# Patient Record
Sex: FEMALE | Race: BLACK OR AFRICAN AMERICAN
[De-identification: names, ages, dates, MRNs, and addresses within clinical notes are randomized per-mention and may not be internally consistent; named-entity substitution may affect disease eponyms.]

---

## 2018-08-14 ENCOUNTER — HOSPITAL ENCOUNTER (EMERGENCY)
Dept: HOSPITAL 72 - EMR | Age: 33
Discharge: HOME | End: 2018-08-14
Payer: MEDICAID

## 2018-08-14 VITALS — WEIGHT: 240 LBS | HEIGHT: 65 IN | BODY MASS INDEX: 39.99 KG/M2

## 2018-08-14 VITALS — DIASTOLIC BLOOD PRESSURE: 85 MMHG | SYSTOLIC BLOOD PRESSURE: 129 MMHG

## 2018-08-14 DIAGNOSIS — I10: ICD-10-CM

## 2018-08-14 DIAGNOSIS — Z88.2: ICD-10-CM

## 2018-08-14 DIAGNOSIS — J45.909: ICD-10-CM

## 2018-08-14 DIAGNOSIS — B37.9: ICD-10-CM

## 2018-08-14 DIAGNOSIS — Z88.1: ICD-10-CM

## 2018-08-14 DIAGNOSIS — Z88.0: ICD-10-CM

## 2018-08-14 DIAGNOSIS — L98.9: Primary | ICD-10-CM

## 2018-08-14 DIAGNOSIS — E11.8: ICD-10-CM

## 2018-08-14 DIAGNOSIS — Z91.040: ICD-10-CM

## 2018-08-14 DIAGNOSIS — Z91.018: ICD-10-CM

## 2018-08-14 DIAGNOSIS — N30.00: ICD-10-CM

## 2018-08-14 LAB
ADD MANUAL DIFF: NO
ALBUMIN SERPL-MCNC: 3.5 G/DL (ref 3.4–5)
ALBUMIN/GLOB SERPL: 0.7 {RATIO} (ref 1–2.7)
ALP SERPL-CCNC: 44 U/L (ref 46–116)
ALT SERPL-CCNC: 19 U/L (ref 12–78)
ANION GAP SERPL CALC-SCNC: 11 MMOL/L (ref 5–15)
APPEARANCE UR: (no result)
APTT PPP: YELLOW S
AST SERPL-CCNC: 26 U/L (ref 15–37)
BASOPHILS NFR BLD AUTO: 1.1 % (ref 0–2)
BILIRUB SERPL-MCNC: 0.3 MG/DL (ref 0.2–1)
BUN SERPL-MCNC: 7 MG/DL (ref 7–18)
CALCIUM SERPL-MCNC: 8.9 MG/DL (ref 8.5–10.1)
CHLORIDE SERPL-SCNC: 104 MMOL/L (ref 98–107)
CO2 SERPL-SCNC: 20 MMOL/L (ref 21–32)
CREAT SERPL-MCNC: 0.8 MG/DL (ref 0.55–1.3)
EOSINOPHIL NFR BLD AUTO: 0.7 % (ref 0–3)
ERYTHROCYTE [DISTWIDTH] IN BLOOD BY AUTOMATED COUNT: 12 % (ref 11.6–14.8)
GLOBULIN SER-MCNC: 4.9 G/DL
GLUCOSE UR STRIP-MCNC: (no result) MG/DL
HCT VFR BLD CALC: 39.5 % (ref 37–47)
HGB BLD-MCNC: 13.4 G/DL (ref 12–16)
KETONES UR QL STRIP: (no result)
LEUKOCYTE ESTERASE UR QL STRIP: (no result)
LYMPHOCYTES NFR BLD AUTO: 41.5 % (ref 20–45)
MCV RBC AUTO: 92 FL (ref 80–99)
MONOCYTES NFR BLD AUTO: 6.5 % (ref 1–10)
NEUTROPHILS NFR BLD AUTO: 50.2 % (ref 45–75)
NITRITE UR QL STRIP: NEGATIVE
PH UR STRIP: 6 [PH] (ref 4.5–8)
PLATELET # BLD: 279 K/UL (ref 150–450)
POTASSIUM SERPL-SCNC: 4 MMOL/L (ref 3.5–5.1)
PROT UR QL STRIP: (no result)
RBC # BLD AUTO: 4.27 M/UL (ref 4.2–5.4)
SODIUM SERPL-SCNC: 135 MMOL/L (ref 136–145)
SP GR UR STRIP: 1.01 (ref 1–1.03)
UROBILINOGEN UR-MCNC: NORMAL MG/DL (ref 0–1)
WBC # BLD AUTO: 10.5 K/UL (ref 4.8–10.8)

## 2018-08-14 PROCEDURE — 87590 N.GONORRHOEAE DNA DIR PROB: CPT

## 2018-08-14 PROCEDURE — 87086 URINE CULTURE/COLONY COUNT: CPT

## 2018-08-14 PROCEDURE — 81025 URINE PREGNANCY TEST: CPT

## 2018-08-14 PROCEDURE — 99283 EMERGENCY DEPT VISIT LOW MDM: CPT

## 2018-08-14 PROCEDURE — 36415 COLL VENOUS BLD VENIPUNCTURE: CPT

## 2018-08-14 PROCEDURE — 80053 COMPREHEN METABOLIC PANEL: CPT

## 2018-08-14 PROCEDURE — 81003 URINALYSIS AUTO W/O SCOPE: CPT

## 2018-08-14 PROCEDURE — 87491 CHLMYD TRACH DNA AMP PROBE: CPT

## 2018-08-14 PROCEDURE — 85025 COMPLETE CBC W/AUTO DIFF WBC: CPT

## 2018-08-14 NOTE — EMERGENCY ROOM REPORT
History of Present Illness


General


Chief Complaint:  Vaginal


Source:  Patient





Present Illness


HPI


Patient presents with perineal rash.  She states she's had it since she was 

discharged from the hospital.  She's discharged on July 3 for pyelonephritis 

and sepsis.  The patient is diabetic (she reports "borderline" but is on 2 meds)

.  She reports pain in genitals is 7/10, mostly constant but worsened with 

urination - with burning.  Not radiate.  Was taking diclofenac in the past for 

pain, but she states it does not help.





Patient was admitted in June for UTI and sepsis.  She also had acute kidney 

injury at that time (creat 1.7).  She states that the rash developed while she 

was in the hospital and has persisted since that time.  She states that she was 

taking the Keflex 4 times a day initially, but then the pharmacy told her not 

to take it one time a day because it was too strong for her because she 

developed a rash in her perineum.  (She had been discharged with normal renal 

function.)





The last time she had sex with her boyfriend was Father's Day.  She states that 

they use condoms.  She states they do no have any foreplay or oral sex.  She 

states that her boyfriend denies any sexually transmitted diseases.





In the past she's been treated with 4 antibiotic shots after being seen for 

similar symptoms.  She alleges she has never had a rash and pain like this in 

the past.  However, review of diagnoses reveals candida and multiple visits for 

vaginitis.  In June 2017 she had vaginal pain and "satellite lesions" felt to 

be candida and reported pain at that time.  





She states her Ob/Gyn refuses to see her for "follow up" exams from the 

hospital.





Someone stole her [employer] machine.  Not eat for 2 days because they ran out 

of food at home.  Has been drinking juices.





H/O asthma.  No wheezing or coughing.





HA in past.  Denies now.





No NVD.


Allergies:  


Coded Allergies:  


     PENICILLINS (Verified  Allergy, Severe, Anaphylaxis, 6/28/18)


     PINEAPPLE (Verified  Allergy, Severe, 6/28/18)


     SULFAMETHOXAZOLE (Verified  Allergy, Severe, Anaphylaxis, 6/28/18)


     TRIMETHOPRIM (Verified  Allergy, Severe, Anaphylaxis, 6/28/18)


     LATEX, NATURAL RUBBER (Verified  Allergy, Unknown, 6/28/18)


Uncoded Allergies:  


     MUSHROOMS (Allergy, Unknown, 6/28/18)





Patient History


Past Medical History:  see triage record, old chart reviewed


Social History:  Denies: smoking


Social History Narrative


lives with aunt


Last Menstrual Period:  unk


Reviewed Nursing Documentation:  PMH: Agreed; PSxH: Agreed





Nursing Documentation-PMH


Hx Cardiac Problems:  Yes


Hx Hypertension:  Yes


Hx Pacemaker:  No


Hx Asthma:  Yes


Hx COPD:  No


Hx Diabetes:  Yes - Borderline DM


Hx Cancer:  No


Hx Gastrointestinal Problems:  No


Hx Dialysis:  No


Hx Neurological Problems:  No


Hx Cerebrovascular Accident:  No


Hx Seizures:  No





Review of Systems


All Other Systems:  negative except mentioned in HPI





Physical Exam





Vital Signs








  Date Time  Temp Pulse Resp B/P (MAP) Pulse Ox O2 Delivery O2 Flow Rate FiO2


 


8/14/18 00:41 98.6 92 18 115/81 97 Room Air  





 98.6       








Sp02 EP Interpretation:  reviewed, normal


General Appearance:  no apparent distress, GCS 15, obese


Head:  normocephalic


Eyes:  bilateral eye normal inspection, bilateral eye PERRL, bilateral eye EOMI


ENT:  moist mucus membranes


Neck:  supple


Respiratory:  lungs clear, normal breath sounds


Cardiovascular #1:  regular rate, rhythm


Cardiovascular #2:  2+ radial (R)


Gastrointestinal:  normal inspection, normal bowel sounds, non tender, no mass, 

non-distended, overweight


Genitourinary:  other - multiple ulcerated lesions throughout genitals and 

perineum.  No significant labial swelling.  Lesions are painful.  No area of 

fluctuance.  Speculum exam deferred due to discomfort.


Musculoskeletal:  back normal, gait/station normal, normal range of motion


Neurologic:  alert, oriented x3, grossly normal


Psychiatric:  other - slightly slow in responses though appropriate


Skin:  normal inspection, warm/dry, other - see 





Medical Decision Making


Diagnostic Impression:  


 Primary Impression:  


 Lesion of female perineum


 Additional Impressions:  


 UTI (urinary tract infection)


 Qualified Codes:  N30.00 - Acute cystitis without hematuria


 Diabetes


 Qualified Codes:  E11.8 - Type 2 diabetes mellitus with unspecified 

complications


 Monilia infection


ER Course


Patient with painful vaginal lesions for 1 month after treatment for UTI/

sepsis.  Also was advised to change dosing of keflex to a probable ineffective 

dosing schedule.  Ddx: UTI, herpes, candida, diabetes poor control with 

diabetic lesions, abrasions amongst others.  Based on exam herpes high on the 

list.  She has had some recurrent lesions in the past which were felt to be 

yeast.  Her diabetes places her at higher risk for monilia.  Based on the 

recent serious infection, labs indicated.  Will treat pain.





Labs with elevated glucose, normal renal function.  Urinalysis shows yeast and 

pyuria.  There is no evidence of Trichomonas at this time.  





The patient will be treated for herpes, UTI and also yeast.  In the past she 

was treated with fluconazole but only one dose.  Topical creams will be used at 

this time.  Consideration for longer course of fluconazole if this it not 

effective.





Advised patient of need for follow with her physician - preferably Ob/Gyn.  To 

state that she has a problem needing evaluation, not "hospital follow up".





She was given a sandwich but refused it because it has some ice on it.





Patient improved with treatment and understands treatment plan.





Patient stable for outpatient observation and treatment.





Laboratory Tests








Test


  8/14/18


00:11 8/14/18


01:40


 


Urine Color Yellow   


 


Urine Appearance Cloudy   


 


Urine pH 6 (4.5-8.0)   


 


Urine Specific Gravity


  1.015


(1.005-1.035) 


 


 


Urine Protein


  2+ (NEGATIVE)


H 


 


 


Urine Glucose (UA)


  2+ (NEGATIVE)


H 


 


 


Urine Ketones


  2+ (NEGATIVE)


H 


 


 


Urine Occult Blood


  5+ (NEGATIVE)


H 


 


 


Urine Nitrite


  Negative


(NEGATIVE) 


 


 


Urine Bilirubin


  Negative


(NEGATIVE) 


 


 


Urine Urobilinogen


  Normal MG/DL


(0.0-1.0) 


 


 


Urine Leukocyte Esterase


  3+ (NEGATIVE)


H 


 


 


Urine RBC


  10-15 /HPF (0


- 2)  H 


 


 


Urine WBC


  Tntc /HPF (0 -


2)  H 


 


 


Urine Squamous Epithelial


Cells Moderate /LPF


(NONE/OCC)  H 


 


 


Urine Bacteria


  Moderate /HPF


(NONE)  H 


 


 


Urine Yeast


  Few /HPF


(NONE)  H 


 


 


Urine HCG, Qualitative


  Negative


(NEGATIVE) 


 


 


Chlamydia trachomatis RNA Pending   


 


Neisseria gonorrhoeae RNA Pending   


 


White Blood Count


  


  10.5 K/UL


(4.8-10.8)


 


Red Blood Count


  


  4.27 M/UL


(4.20-5.40)


 


Hemoglobin


  


  13.4 G/DL


(12.0-16.0)


 


Hematocrit


  


  39.5 %


(37.0-47.0)


 


Mean Corpuscular Volume  92 FL (80-99)  


 


Mean Corpuscular Hemoglobin


  


  31.4 PG


(27.0-31.0)  H


 


Mean Corpuscular Hemoglobin


Concent 


  34.0 G/DL


(32.0-36.0)


 


Red Cell Distribution Width


  


  12.0 %


(11.6-14.8)


 


Platelet Count


  


  279 K/UL


(150-450)


 


Mean Platelet Volume


  


  7.1 FL


(6.5-10.1)


 


Neutrophils (%) (Auto)


  


  50.2 %


(45.0-75.0)


 


Lymphocytes (%) (Auto)


  


  41.5 %


(20.0-45.0)


 


Monocytes (%) (Auto)


  


  6.5 %


(1.0-10.0)


 


Eosinophils (%) (Auto)


  


  0.7 %


(0.0-3.0)


 


Basophils (%) (Auto)


  


  1.1 %


(0.0-2.0)


 


Sodium Level


  


  135 MMOL/L


(136-145)  L


 


Potassium Level


  


  4.0 MMOL/L


(3.5-5.1)


 


Chloride Level


  


  104 MMOL/L


()


 


Carbon Dioxide Level


  


  20 MMOL/L


(21-32)  L


 


Anion Gap


  


  11 mmol/L


(5-15)


 


Blood Urea Nitrogen


  


  7 mg/dL (7-18)


 


 


Creatinine


  


  0.8 MG/DL


(0.55-1.30)


 


Estimate Glomerular


Filtration Rate 


  > 60 mL/min


(>60)


 


Glucose Level


  


  161 MG/DL


()  H


 


Calcium Level


  


  8.9 MG/DL


(8.5-10.1)


 


Total Bilirubin


  


  0.3 MG/DL


(0.2-1.0)


 


Aspartate Amino Transferase


(AST) 


  26 U/L (15-37)


 


 


Alanine Aminotransferase (ALT)


  


  19 U/L (12-78)


 


 


Alkaline Phosphatase


  


  44 U/L


()  L


 


Total Protein


  


  8.4 G/DL


(6.4-8.2)  H


 


Albumin


  


  3.5 G/DL


(3.4-5.0)


 


Globulin  4.9 g/dL  


 


Albumin/Globulin Ratio


  


  0.7 (1.0-2.7)


L











Last Vital Signs








  Date Time  Temp Pulse Resp B/P (MAP) Pulse Ox O2 Delivery O2 Flow Rate FiO2


 


8/14/18 05:14 98.2 101 18 129/85 97 Room Air  





 98.2       








Status:  improved


Disposition:  HOME, SELF-CARE


Condition:  Improved


Scripts


Lancets (ADVOCATE LANCET) 1 Each Each


EACH MC DAILY, #100


   Prov: Stefan Prieto M.D.         8/14/18 


Blood-Glucose Meter (BLOOD GLUCOSE METER) 1 Each Each


EACH MC DAILY, #1


   Prov: Stefan Prieto M.D.         8/14/18 


Nitrofurantoin Monohyd/M-Cryst* (MACROBID 100 MG*) 100 Mg Capsule


100 MG ORAL EVERY 12 HOURS, #14 CAP


   Prov: Stefan Prieto M.D.         8/14/18 


Hydrocodone Bit/Acetaminophen 5-325* (NORCO 5-325*) 1 Each Tablet


1 TAB ORAL Q6H PRN for For Pain, #10 TAB 0 Refills


   Prov: Stefan Prieto M.D.         8/14/18 


Acyclovir* (ACYCLOVIR*) 400 Mg Tablet


400 MG ORAL TID, #20 TAB


   Prov: Stefan Prieto M.D.         8/14/18 


Clotrimazole (GYNE-LOTRIMIN*) 45 Gm Cream.appl


1 APPLIC VG HS for 7 Days, #45 GM 0 Refills


   Prov: Stefan Prieto M.D.         8/14/18


Referrals:  


NON PHYSICIAN (PCP)











Stefan Prieto M.D. Aug 14, 2018 01:22

## 2018-09-08 ENCOUNTER — HOSPITAL ENCOUNTER (EMERGENCY)
Dept: HOSPITAL 72 - EMR | Age: 33
Discharge: HOME | End: 2018-09-08
Payer: MEDICAID

## 2018-09-08 VITALS — WEIGHT: 236 LBS | HEIGHT: 65 IN | BODY MASS INDEX: 39.32 KG/M2

## 2018-09-08 VITALS — SYSTOLIC BLOOD PRESSURE: 117 MMHG | DIASTOLIC BLOOD PRESSURE: 71 MMHG

## 2018-09-08 VITALS — SYSTOLIC BLOOD PRESSURE: 126 MMHG | DIASTOLIC BLOOD PRESSURE: 78 MMHG

## 2018-09-08 DIAGNOSIS — S93.602A: ICD-10-CM

## 2018-09-08 DIAGNOSIS — S93.402A: Primary | ICD-10-CM
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
## 2018-09-08 PROCEDURE — 99283 EMERGENCY DEPT VISIT LOW MDM: CPT

## 2018-09-08 NOTE — DIAGNOSTIC IMAGING REPORT
EXAM:

  XR Left Ankle Complete, 3 or More Views

 

CLINICAL HISTORY:

  PAIN

 

TECHNIQUE:

  Frontal, lateral and oblique views of the left ankle.

 

COMPARISON:

  No relevant prior studies available.

 

FINDINGS:

  Bones/joints: Posterior calcaneal spur.  No acute fracture.  No 

dislocation.

  Soft tissues:  Unremarkable.

 

IMPRESSION:     

Calcaneal spur.  Otherwise unremarkable.

## 2018-09-08 NOTE — EMERGENCY ROOM REPORT
History of Present Illness


General


Chief Complaint:  Pain


Source:  Patient, Medical Record





Present Illness


HPI


33-year-old female presents to the emergency department complaining of 6 out of 

10 in severity localized pain to the anterior portion of the right ankle 3 

days.  Patient reports that she was playfully kicking at a family member who 

blocked her kicked and caused acute pain in the anterior portion of the ankle.  

She denies trauma or fall otherwise.  Patient reports she is able to ambulate 

however there is some pain with ambulation and flexing the foot towards her.  

Denies previous injury to this extremity.  Patient denies swelling, bruising, 

erythema, increased temperature palpation.  Denies paresthesias or loss of 

gross motor movements.


Allergies:  


Coded Allergies:  


     PENICILLINS (Verified  Allergy, Severe, Anaphylaxis, 9/8/18)


     PINEAPPLE (Verified  Allergy, Severe, 9/8/18)


     SULFAMETHOXAZOLE (Verified  Allergy, Severe, Anaphylaxis, 9/8/18)


     TRIMETHOPRIM (Verified  Allergy, Severe, Anaphylaxis, 9/8/18)


     LATEX, NATURAL RUBBER (Verified  Allergy, Unknown, 9/8/18)


Uncoded Allergies:  


     MUSHROOMS (Allergy, Unknown, 6/28/18)





Patient History


Past Medical History:  see triage record


Past Surgical History:  none


Pertinent Family History:  none


Pregnant Now:  No


Immunizations:  UTD


Reviewed Nursing Documentation:  PMH: Agreed; PSxH: Agreed





Nursing Documentation-PMH


Past Medical History:  No History, Except For


Hx Cardiac Problems:  Yes


Hx Hypertension:  Yes


Hx Pacemaker:  No


Hx Asthma:  Yes


Hx COPD:  No


Hx Diabetes:  Yes - Borderline DM


Hx Cancer:  No


Hx Gastrointestinal Problems:  No


Hx Dialysis:  No


Hx Neurological Problems:  No


Hx Cerebrovascular Accident:  No


Hx Seizures:  No





Review of Systems


All Other Systems:  negative except mentioned in HPI





Physical Exam





Vital Signs








  Date Time  Temp Pulse Resp B/P (MAP) Pulse Ox O2 Delivery O2 Flow Rate FiO2


 


9/8/18 15:38 98.8 77 16 126/78 96 Room Air  





 98.8       








Sp02 EP Interpretation:  reviewed, normal


General Appearance:  no apparent distress, alert, GCS 15, non-toxic


Head:  normocephalic, atraumatic


ENT:  hearing grossly normal, normal voice


Neck:  full range of motion


Respiratory:  lungs clear, normal breath sounds, speaking full sentences


Cardiovascular #1:  regular rate, rhythm, normal capillary refill


Cardiovascular #2:  2+ dorsalis pedis (L)


Musculoskeletal:  back normal, gait/station normal, normal range of motion, 

tender - TTP to the anterior left ankle, FROM , no obvious swelling, no bruises

, no obvious deformities or increased laxity.


Neurologic:  alert, oriented x3, responsive, motor strength/tone normal, 

sensory intact, normal gait, speech normal, grossly normal


Psychiatric:  judgement/insight normal


Skin:  normal color, no rash, warm/dry, well hydrated





Medical Decision Making


PA Attestation


Dr. Johnson  is my supervising Physician whom patient management has been 

discussed with.


Diagnostic Impression:  


 Primary Impression:  


 Ankle sprain


 Qualified Codes:  S93.402A - Sprain of unspecified ligament of left ankle, 

initial encounter


 Additional Impression:  


 Foot sprain


 Qualified Codes:  S93.602A - Unspecified sprain of left foot, initial encounter


ER Course


33-year-old female presents to the emergency department complaining of 6 out of 

10 in severity localized pain to the anterior portion of the left  ankle 3 

days.  Patient reports that she was playfully kicking at a family member who 

blocked her kicked and caused acute pain in the anterior portion of the ankle.  

She denies trauma or fall otherwise.  Patient reports she is able to ambulate 

however there is some pain with ambulation and flexing the foot towards her.  

Denies previous injury to this extremity.  Patient denies swelling, bruising, 

erythema, increased temperature palpation.  Denies paresthesias or loss of 

gross motor movements.





Ddx considered but are not limited to Fracture, dislocation, contusion,  Sprain/

Strain/Spasm.





Vital signs: are WNL, pt. is afebrile


H&PE are most consistent with musculoskeletal injury will perform imaging to r/

o fractures/dislocations. 





ORDERS:





-  X-ray  eft   - negative for fx, Dislocation, or significant soft tissue 

injury, per preliminary read in ED, and signed by  CRAIG Lee, my 

supervising physician has reviewed, and agrees with my interpretation.





ED INTERVENTIONS:





-  Motrin PO


Ace wrap applied to the left ankle  by ED tech. Pt. remains neurovascularly 

intact.





pt declines crutches








DISCHARGE: At this time pt. is stable for d/c to home. Will provide printed 

patient care instructions, and any necessary prescriptions. Care plan and 

follow up instructions have been discussed with the patient prior to discharge.


Other X-Ray Diagnostic Results


Other X-Ray Diagnostic Results :  


   X-Ray ordered:  Left Ankle


   # of Views/Limited Vs Complete:  3 View


   Indication:  Pain


   EP Interpretation:  Yes


   PA Xray:  Interpretation reviewed, by supervising MD, and agrees with 

findings.


   Interpretation:  no dislocation, no soft tissue swelling, no fractures


   Impression:  No acute disease


   Electronically Signed by:  Evette Lee PA-C





Last Vital Signs








  Date Time  Temp Pulse Resp B/P (MAP) Pulse Ox O2 Delivery O2 Flow Rate FiO2


 


9/8/18 15:40 98.8 77 16 126/78 96 Room Air  





 98.8       








Disposition:  HOME, SELF-CARE


Condition:  Stable


Scripts


Ibuprofen* (MOTRIN*) 600 Mg Tablet


600 MG ORAL THREE TIMES A DAY, #30 TAB 0 Refills


   Prov: Evette Lee         9/8/18


Referrals:  


NON PHYSICIAN (PCP)


Patient Instructions:  Ankle Sprain, Easy-to-Read, Foot Sprain





Additional Instructions:  


Take medications as directed. 


 ** Follow up with a Primary Care Provider in 3-5 days, even if your symptoms 

have resolved. ** 


--Please review list of primary care clinics, if you do not already have a 

primary care provider





Return sooner to ED if new symptoms occur, or current symptoms become worse. 











- Please note that this Emergency Department Report was dictated using [employer] technology software, occasionally this can lead to 

erroneous entry secondary to interpretation by the dictation equipment.











Evette Lee Sep 8, 2018 17:11

## 2019-02-25 ENCOUNTER — HOSPITAL ENCOUNTER (EMERGENCY)
Dept: HOSPITAL 72 - EMR | Age: 34
Discharge: HOME | End: 2019-02-25
Payer: MEDICAID

## 2019-02-25 VITALS — BODY MASS INDEX: 39.99 KG/M2 | WEIGHT: 240 LBS | HEIGHT: 65 IN

## 2019-02-25 VITALS — SYSTOLIC BLOOD PRESSURE: 135 MMHG | DIASTOLIC BLOOD PRESSURE: 90 MMHG

## 2019-02-25 VITALS — SYSTOLIC BLOOD PRESSURE: 133 MMHG | DIASTOLIC BLOOD PRESSURE: 85 MMHG

## 2019-02-25 DIAGNOSIS — R73.03: ICD-10-CM

## 2019-02-25 DIAGNOSIS — Z91.018: ICD-10-CM

## 2019-02-25 DIAGNOSIS — Z91.040: ICD-10-CM

## 2019-02-25 DIAGNOSIS — N93.8: Primary | ICD-10-CM

## 2019-02-25 DIAGNOSIS — I10: ICD-10-CM

## 2019-02-25 DIAGNOSIS — Z88.0: ICD-10-CM

## 2019-02-25 DIAGNOSIS — L73.9: ICD-10-CM

## 2019-02-25 DIAGNOSIS — Z88.2: ICD-10-CM

## 2019-02-25 LAB
ADD MANUAL DIFF: NO
ANION GAP SERPL CALC-SCNC: 11 MMOL/L (ref 5–15)
BASOPHILS NFR BLD AUTO: 1.4 % (ref 0–2)
BUN SERPL-MCNC: 7 MG/DL (ref 7–18)
CALCIUM SERPL-MCNC: 9.3 MG/DL (ref 8.5–10.1)
CHLORIDE SERPL-SCNC: 100 MMOL/L (ref 98–107)
CO2 SERPL-SCNC: 22 MMOL/L (ref 21–32)
CREAT SERPL-MCNC: 1 MG/DL (ref 0.55–1.3)
EOSINOPHIL NFR BLD AUTO: 0.7 % (ref 0–3)
ERYTHROCYTE [DISTWIDTH] IN BLOOD BY AUTOMATED COUNT: 11.6 % (ref 11.6–14.8)
HCT VFR BLD CALC: 44.7 % (ref 37–47)
HGB BLD-MCNC: 14.7 G/DL (ref 12–16)
LYMPHOCYTES NFR BLD AUTO: 32.3 % (ref 20–45)
MCV RBC AUTO: 94 FL (ref 80–99)
MONOCYTES NFR BLD AUTO: 5.2 % (ref 1–10)
NEUTROPHILS NFR BLD AUTO: 60.4 % (ref 45–75)
PLATELET # BLD: 275 K/UL (ref 150–450)
POTASSIUM SERPL-SCNC: 5.2 MMOL/L (ref 3.5–5.1)
RBC # BLD AUTO: 4.73 M/UL (ref 4.2–5.4)
SODIUM SERPL-SCNC: 133 MMOL/L (ref 136–145)
WBC # BLD AUTO: 8.5 K/UL (ref 4.8–10.8)

## 2019-02-25 PROCEDURE — 36415 COLL VENOUS BLD VENIPUNCTURE: CPT

## 2019-02-25 PROCEDURE — 99283 EMERGENCY DEPT VISIT LOW MDM: CPT

## 2019-02-25 PROCEDURE — 80048 BASIC METABOLIC PNL TOTAL CA: CPT

## 2019-02-25 PROCEDURE — 81025 URINE PREGNANCY TEST: CPT

## 2019-02-25 PROCEDURE — 85025 COMPLETE CBC W/AUTO DIFF WBC: CPT

## 2019-02-25 NOTE — EMERGENCY ROOM REPORT
History of Present Illness


General


Chief Complaint:  Female Urogenital Problems


Source:  Medical Record





Present Illness


HPI


34 YO female presents to the ed c/o bleeding x 1 month. pt. with hx of uterine 

fibroids and irregular periods. pt. denies fevers or chills. denies pregnancy.  

Pt. also reports infected bug bite on the right side of her neck with tenderness

, erythema, swelling , and pustule. Pt. denies Dizziness, headache, syncope, 

palpitations or chest pain.  Patient states that at one point she was taking 

birth control to help with her symptoms however she states she is no longer 

utilizing medication as she did not see improvement in her symptoms.  Patient 

reports that she is in between insurances at this moment and cannot see her OB/

GYN that she was previously being evaluated. Reports intermittent 5/10 in 

severity cramping pains, denies pain at this time. denies vaginal d/c, swollen 

tender lymph nodes, hx of STI, suspicion for STI.


Allergies:  


Coded Allergies:  


     PENICILLINS (Verified  Allergy, Severe, Anaphylaxis, 9/8/18)


     PINEAPPLE (Verified  Allergy, Severe, 9/8/18)


     SULFAMETHOXAZOLE (Verified  Allergy, Severe, Anaphylaxis, 9/8/18)


     TRIMETHOPRIM (Verified  Allergy, Severe, Anaphylaxis, 9/8/18)


     LATEX, NATURAL RUBBER (Verified  Allergy, Unknown, 9/8/18)


Uncoded Allergies:  


     MUSHROOMS (Allergy, Unknown, 6/28/18)





Patient History


Past Medical History:  see triage record


Past Surgical History:  none


Pertinent Family History:  none


Last Menstrual Period:  01/25/19


Reviewed Nursing Documentation:  PMH: Agreed; PSxH: Agreed





Nursing Documentation-PMH


Past Medical History:  No History, Except For


Hx Cardiac Problems:  Yes


Hx Hypertension:  Yes


Hx Pacemaker:  No


Hx Asthma:  Yes


Hx COPD:  No


Hx Diabetes:  Yes - Borderline DM


Hx Cancer:  No


Hx Gastrointestinal Problems:  No


Hx Dialysis:  No


Hx Neurological Problems:  No


Hx Cerebrovascular Accident:  No


Hx Seizures:  No





Review of Systems


All Other Systems:  negative except mentioned in HPI





Physical Exam





Vital Signs








  Date Time  Temp Pulse Resp B/P (MAP) Pulse Ox O2 Delivery O2 Flow Rate FiO2


 


2/25/19 13:40 99.3 90 18 136/92 97 Room Air  








Sp02 EP Interpretation:  reviewed, normal


General Appearance:  no apparent distress, alert, GCS 15, non-toxic, obese


Head:  normocephalic, atraumatic


Eyes:  bilateral eye normal inspection, bilateral eye PERRL


ENT:  hearing grossly normal, normal voice, other - foliculitis on the right 

side of the neck/chin, pt. with significant facial hair suspect hirsutism.


Neck:  full range of motion


Respiratory:  chest non-tender, lungs clear, normal breath sounds, speaking 

full sentences


Cardiovascular #1:  regular rate, rhythm


Gastrointestinal:  normal bowel sounds, non tender, soft, non-distended, no 

guarding


Rectal:  deferred


Genitourinary:  normal inspection, no CVA tenderness


Musculoskeletal:  back normal, gait/station normal, normal range of motion, non-

tender


Neurologic:  alert, oriented x3, responsive, motor strength/tone normal, 

sensory intact, normal gait, speech normal, grossly normal


Psychiatric:  judgement/insight normal


Skin:  normal color, no rash, warm/dry, well hydrated





Medical Decision Making


PA Attestation


Dr. Hernandez is my supervising Physician whom patient management has been 

discussed with.


Diagnostic Impression:  


 Primary Impression:  


 DUB (dysfunctional uterine bleeding)


 Additional Impression:  


 Folliculitis


ER Course


34 YO female presents to the ed c/o bleeding x 1 month. pt. with hx of uterine 

fibroids and irregular periods. pt. denies fevers or chills. denies pregnancy. 


 


Ddx considered but are not limited to: Hormonal imbalance, pregnancy, ectopic 

pregnancy, DUB, Fibroid, ectopic pregnancy, anemia, hirsutism


Vital signs: are WNL, pt. is afebrile 





H&PE are most consistent with:  Irregular menses and folliculitis





ORDERS: 


-Urine hcg- Negative


-CBC: Unremarkable-- no evidence of significant blood loss. pt. asymptomatic


-BMP: mildly elevated potassium 








ED INTERVENTIONS: None at this time. 








-I do not identify an emergent condition at this time. With current presentation

,  pt. is stable for close outpatient follow up and conservative treatment.  D/

w pt. to return promptly to ED with worsening or new symptoms.- Pt. verbalizes' 

understanding and agreement with proposed treatment plan.proposed treatment 

plan.





DISCHARGE: At this time pt. is stable for d/c to home. Will provide printed 

patient care instructions, and any necessary prescriptions. Care plan and 

follow up instructions have been discussed with the patient prior to discharge.





Labs








Test


  2/25/19


13:56 2/25/19


14:10


 


White Blood Count


  8.5 K/UL


(4.8-10.8) 


 


 


Red Blood Count


  4.73 M/UL


(4.20-5.40) 


 


 


Hemoglobin


  14.7 G/DL


(12.0-16.0) 


 


 


Hematocrit


  44.7 %


(37.0-47.0) 


 


 


Mean Corpuscular Volume 94 FL (80-99)  


 


Mean Corpuscular Hemoglobin


  30.9 PG


(27.0-31.0) 


 


 


Mean Corpuscular Hemoglobin


Concent 32.8 G/DL


(32.0-36.0) 


 


 


Red Cell Distribution Width


  11.6 %


(11.6-14.8) 


 


 


Platelet Count


  275 K/UL


(150-450) 


 


 


Mean Platelet Volume


  7.2 FL


(6.5-10.1) 


 


 


Neutrophils (%) (Auto)


  60.4 %


(45.0-75.0) 


 


 


Lymphocytes (%) (Auto)


  32.3 %


(20.0-45.0) 


 


 


Monocytes (%) (Auto)


  5.2 %


(1.0-10.0) 


 


 


Eosinophils (%) (Auto)


  0.7 %


(0.0-3.0) 


 


 


Basophils (%) (Auto)


  1.4 %


(0.0-2.0) 


 


 


Sodium Level


  133 MMOL/L


(136-145) 


 


 


Potassium Level


  5.2 MMOL/L


(3.5-5.1) 


 


 


Chloride Level


  100 MMOL/L


() 


 


 


Carbon Dioxide Level


  22 MMOL/L


(21-32) 


 


 


Anion Gap


  11 mmol/L


(5-15) 


 


 


Blood Urea Nitrogen 7 mg/dL (7-18)  


 


Creatinine


  1.0 MG/DL


(0.55-1.30) 


 


 


Estimat Glomerular Filtration


Rate > 60 mL/min


(>60) 


 


 


Glucose Level


  364 MG/DL


() 


 


 


Calcium Level


  9.3 MG/DL


(8.5-10.1) 


 


 


Urine HCG, Qualitative


  


  Negative


(NEGATIVE)











Last Vital Signs








  Date Time  Temp Pulse Resp B/P (MAP) Pulse Ox O2 Delivery O2 Flow Rate FiO2


 


2/25/19 13:57 99.3 75 18 135/90 97 Room Air  








Disposition:  HOME, SELF-CARE


Condition:  Stable


Scripts


Mupirocin* (MUPIROCIN*) 22 Gm Oint...g.


1 APPLIC TOPIC THREE TIMES A DAY, #22 GM


   Prov: Evette Lee         2/25/19 


Doxycycline Hyclate* (VIBRAMYCIN*) 100 Mg Capsule


100 MG ORAL EVERY 12 HOURS for 7 Days, #14 CAP 0 Refills


   Prov: Evette Lee         2/25/19 


Iron,Carbonyl/Ascorbic Acid (IRON 100-VITAMIN C TABLET) 1 Each Tablet


1 EACH PO BID, #60 TAB


   Prov: Evette Lee         2/25/19 


Norethindrone-E.estradiol-Iron (Blisovi Fe 1.5-30 Tablet) 1 Each Tablet


1 EACH PO DAILY, #30 TAB


   Prov: Evette Lee         2/25/19


Referrals:  


NON PHYSICIAN (PCP)


Patient Instructions:  Folliculitis, Menorrhagia





Additional Instructions:  


Take medications as directed. 





 ** Follow up with a OBGYN  within 3-5 days, even if your symptoms have 

resolved. **   





Return sooner to ED if new symptoms occur, or current symptoms become worse. 











- Please note that this Emergency Department Report was dictated using [employer] technology software, occasionally this can lead to 

erroneous entry secondary to interpretation by the dictation equipment.











Evette Lee Feb 25, 2019 14:38

## 2019-02-25 NOTE — NUR
ED Nurse Note:



Pt came into the ER w/ complaints of vaginal pain x 2 months. Pt states that it 
has been bleeding intermittently. Pt is complaining of 7/10 pain in the groin 
area. A+ O x4. Ambulatory. Skin warm to touch.

## 2019-02-25 NOTE — NUR
ER DISCHARGE NOTE:

Patient is cleared to be discharged per ERMD, pt is aox4, on room air, with 
stable vital signs. pt was given dc and prescription instructions, pt was able 
to verbalize understanding, pt id band removed without complications. pt is 
able to ambulate with steady gait. pt took all belongings.

## 2020-02-02 ENCOUNTER — HOSPITAL ENCOUNTER (EMERGENCY)
Dept: HOSPITAL 72 - EMR | Age: 35
LOS: 1 days | Discharge: ANOTHER HOSPITAL | End: 2020-02-03
Payer: MEDICAID

## 2020-02-02 VITALS — BODY MASS INDEX: 41.65 KG/M2 | WEIGHT: 250 LBS | HEIGHT: 65 IN

## 2020-02-02 VITALS — SYSTOLIC BLOOD PRESSURE: 116 MMHG | DIASTOLIC BLOOD PRESSURE: 70 MMHG

## 2020-02-02 VITALS — DIASTOLIC BLOOD PRESSURE: 75 MMHG | SYSTOLIC BLOOD PRESSURE: 118 MMHG

## 2020-02-02 DIAGNOSIS — N39.0: ICD-10-CM

## 2020-02-02 DIAGNOSIS — I10: ICD-10-CM

## 2020-02-02 DIAGNOSIS — Z88.2: ICD-10-CM

## 2020-02-02 DIAGNOSIS — Z88.8: ICD-10-CM

## 2020-02-02 DIAGNOSIS — A41.9: Primary | ICD-10-CM

## 2020-02-02 DIAGNOSIS — Z91.040: ICD-10-CM

## 2020-02-02 DIAGNOSIS — Z91.018: ICD-10-CM

## 2020-02-02 DIAGNOSIS — R53.1: ICD-10-CM

## 2020-02-02 LAB
ADD MANUAL DIFF: NO
ALBUMIN SERPL-MCNC: 3.4 G/DL (ref 3.4–5)
ALBUMIN/GLOB SERPL: 0.7 {RATIO} (ref 1–2.7)
ALP SERPL-CCNC: 77 U/L (ref 46–116)
ALT SERPL-CCNC: 30 U/L (ref 12–78)
ANION GAP SERPL CALC-SCNC: 9 MMOL/L (ref 5–15)
APPEARANCE UR: (no result)
APTT BLD: 29 SEC (ref 23–33)
APTT PPP: (no result) S
AST SERPL-CCNC: 14 U/L (ref 15–37)
BASOPHILS NFR BLD AUTO: 0.6 % (ref 0–2)
BILIRUB SERPL-MCNC: 0.3 MG/DL (ref 0.2–1)
BUN SERPL-MCNC: 13 MG/DL (ref 7–18)
CALCIUM SERPL-MCNC: 9 MG/DL (ref 8.5–10.1)
CHLORIDE SERPL-SCNC: 97 MMOL/L (ref 98–107)
CK MB SERPL-MCNC: < 0.5 NG/ML (ref 0–3.6)
CK SERPL-CCNC: 80 U/L (ref 26–308)
CO2 SERPL-SCNC: 26 MMOL/L (ref 21–32)
CREAT SERPL-MCNC: 1.3 MG/DL (ref 0.55–1.3)
EOSINOPHIL NFR BLD AUTO: 0.3 % (ref 0–3)
ERYTHROCYTE [DISTWIDTH] IN BLOOD BY AUTOMATED COUNT: 11.4 % (ref 11.6–14.8)
GLOBULIN SER-MCNC: 4.8 G/DL
GLUCOSE UR STRIP-MCNC: (no result) MG/DL
HCT VFR BLD CALC: 38.7 % (ref 37–47)
HGB BLD-MCNC: 12.5 G/DL (ref 12–16)
INR PPP: 0.9 (ref 0.9–1.1)
KETONES UR QL STRIP: (no result)
LEUKOCYTE ESTERASE UR QL STRIP: (no result)
LYMPHOCYTES NFR BLD AUTO: 16.6 % (ref 20–45)
MCV RBC AUTO: 99 FL (ref 80–99)
MONOCYTES NFR BLD AUTO: 8.2 % (ref 1–10)
NEUTROPHILS NFR BLD AUTO: 74.4 % (ref 45–75)
NITRITE UR QL STRIP: NEGATIVE
PH UR STRIP: 6 [PH] (ref 4.5–8)
PHOSPHATE SERPL-MCNC: 1.9 MG/DL (ref 2.5–4.9)
PLATELET # BLD: 253 K/UL (ref 150–450)
POTASSIUM SERPL-SCNC: 3.6 MMOL/L (ref 3.5–5.1)
PROT UR QL STRIP: (no result)
RBC # BLD AUTO: 3.9 M/UL (ref 4.2–5.4)
SODIUM SERPL-SCNC: 132 MMOL/L (ref 136–145)
SP GR UR STRIP: 1.01 (ref 1–1.03)
UROBILINOGEN UR-MCNC: NORMAL MG/DL (ref 0–1)
WBC # BLD AUTO: 12.6 K/UL (ref 4.8–10.8)

## 2020-02-02 PROCEDURE — 85610 PROTHROMBIN TIME: CPT

## 2020-02-02 PROCEDURE — 83880 ASSAY OF NATRIURETIC PEPTIDE: CPT

## 2020-02-02 PROCEDURE — 96368 THER/DIAG CONCURRENT INF: CPT

## 2020-02-02 PROCEDURE — 82553 CREATINE MB FRACTION: CPT

## 2020-02-02 PROCEDURE — 84481 FREE ASSAY (FT-3): CPT

## 2020-02-02 PROCEDURE — 99291 CRITICAL CARE FIRST HOUR: CPT

## 2020-02-02 PROCEDURE — 85730 THROMBOPLASTIN TIME PARTIAL: CPT

## 2020-02-02 PROCEDURE — 83735 ASSAY OF MAGNESIUM: CPT

## 2020-02-02 PROCEDURE — 84100 ASSAY OF PHOSPHORUS: CPT

## 2020-02-02 PROCEDURE — 93005 ELECTROCARDIOGRAM TRACING: CPT

## 2020-02-02 PROCEDURE — 84443 ASSAY THYROID STIM HORMONE: CPT

## 2020-02-02 PROCEDURE — 80053 COMPREHEN METABOLIC PANEL: CPT

## 2020-02-02 PROCEDURE — 96375 TX/PRO/DX INJ NEW DRUG ADDON: CPT

## 2020-02-02 PROCEDURE — 81025 URINE PREGNANCY TEST: CPT

## 2020-02-02 PROCEDURE — 83605 ASSAY OF LACTIC ACID: CPT

## 2020-02-02 PROCEDURE — 87181 SC STD AGAR DILUTION PER AGT: CPT

## 2020-02-02 PROCEDURE — 84484 ASSAY OF TROPONIN QUANT: CPT

## 2020-02-02 PROCEDURE — 87040 BLOOD CULTURE FOR BACTERIA: CPT

## 2020-02-02 PROCEDURE — 83690 ASSAY OF LIPASE: CPT

## 2020-02-02 PROCEDURE — 86710 INFLUENZA VIRUS ANTIBODY: CPT

## 2020-02-02 PROCEDURE — 82962 GLUCOSE BLOOD TEST: CPT

## 2020-02-02 PROCEDURE — 81003 URINALYSIS AUTO W/O SCOPE: CPT

## 2020-02-02 PROCEDURE — 84439 ASSAY OF FREE THYROXINE: CPT

## 2020-02-02 PROCEDURE — 96365 THER/PROPH/DIAG IV INF INIT: CPT

## 2020-02-02 PROCEDURE — 87086 URINE CULTURE/COLONY COUNT: CPT

## 2020-02-02 PROCEDURE — 96366 THER/PROPH/DIAG IV INF ADDON: CPT

## 2020-02-02 PROCEDURE — 85025 COMPLETE CBC W/AUTO DIFF WBC: CPT

## 2020-02-02 PROCEDURE — 71045 X-RAY EXAM CHEST 1 VIEW: CPT

## 2020-02-02 PROCEDURE — 82550 ASSAY OF CK (CPK): CPT

## 2020-02-02 PROCEDURE — 36415 COLL VENOUS BLD VENIPUNCTURE: CPT

## 2020-02-02 NOTE — NUR
ED Nurse Note:



Medications administered, pt tolerated well. NO s/s of distress noted. vss, pt 
aao x 4, resting in bed comfortably

## 2020-02-02 NOTE — NUR
ED Nurse Note:



Pt resting in bed comfortably, aao x 4, no s/s of distress noted, vss. No 
adverse reaction to medications noted/reported.

## 2020-02-02 NOTE — NUR
ED Nurse Note:



Pt BIBA from home CO flu like symptom, n/v, fever, generalized weakness and 
pain 10/10, T 103. Pt reports feeling ill for last 2 days. BS in field was 362; 
BS in room 364 -- ERMD made aware. PT reports not taking diabetes medications 
for last 3 days and has not checked blood sugar because she has no more 
lancets. Pt aao x 4. Awaiting ERMD

## 2020-02-03 VITALS — DIASTOLIC BLOOD PRESSURE: 74 MMHG | SYSTOLIC BLOOD PRESSURE: 114 MMHG

## 2020-02-03 VITALS — SYSTOLIC BLOOD PRESSURE: 119 MMHG | DIASTOLIC BLOOD PRESSURE: 64 MMHG

## 2020-02-03 VITALS — SYSTOLIC BLOOD PRESSURE: 117 MMHG | DIASTOLIC BLOOD PRESSURE: 68 MMHG

## 2020-02-03 VITALS — SYSTOLIC BLOOD PRESSURE: 117 MMHG | DIASTOLIC BLOOD PRESSURE: 67 MMHG

## 2020-02-03 VITALS — SYSTOLIC BLOOD PRESSURE: 115 MMHG | DIASTOLIC BLOOD PRESSURE: 68 MMHG

## 2020-02-03 NOTE — NUR
ED Nurse Note:



Charge Nurse at [facility] unit was called; pending report handoff; 
will continue to reach for handoff.

## 2020-02-03 NOTE — NUR
ED Nurse Note:



[facility] -- RN Lanette given report; Report also given to [facility] for 
transfer. Pt aao x 4, no s/s of distress, VSS.

## 2020-02-03 NOTE — NUR
ED Nurse Note:



Attempt #3 to reach [facility] Charge Nurse; pending handoff report 
for transfer

## 2020-02-03 NOTE — DIAGNOSTIC IMAGING REPORT
Indication: Dyspnea

 

Comparison:  1/13/2014

 

A single view chest radiograph was obtained.

 

Findings:

 

No definite infiltrate or pulmonary vascular congestion identified.  The heart is

enlarged. Bones are unremarkable.

 

IMPRESSION:

 

Cardiomegaly

## 2020-02-03 NOTE — NUR
ED Nurse Note:



Pt resting in bed comfortably, no s/s of distress noted. VSS, aao x 4. PT 
sleeping.

## 2020-02-03 NOTE — EMERGENCY ROOM REPORT
History of Present Illness


General


Chief Complaint:  Generalized Weakness


Source:  Patient





Present Illness


HPI


34-year-old female history of hypertension, thyroid issues presents with fever/

chills x2 days, no known aggravating alleviating factors patient also reports 

generalized weakness, fatigue, no dysuria no nausea no vomiting, no abdominal 

pain, patient presents for evaluation


Allergies:  


Coded Allergies:  


     PENICILLINS (Verified  Allergy, Severe, Anaphylaxis, 9/8/18)


     PINEAPPLE (Verified  Allergy, Severe, 9/8/18)


     SULFAMETHOXAZOLE (Verified  Allergy, Severe, Anaphylaxis, 9/8/18)


     TRIMETHOPRIM (Verified  Allergy, Severe, Anaphylaxis, 9/8/18)


     LATEX, NATURAL RUBBER (Verified  Allergy, Unknown, 9/8/18)


Uncoded Allergies:  


     MUSHROOMS (Allergy, Unknown, 6/28/18)





Patient History


Past Medical History:  see triage record


Last Menstrual Period:  1/2020


Reviewed Nursing Documentation:  PMH: Agreed; PSxH: Agreed





Nursing Documentation-PMH


Hx Cardiac Problems:  Yes


Hx Hypertension:  Yes


Hx Pacemaker:  No


Hx Asthma:  Yes


Hx COPD:  No


Hx Diabetes:  Yes - Borderline DM


Hx Cancer:  No


Hx Gastrointestinal Problems:  No


Hx Dialysis:  No


Hx Neurological Problems:  No


Hx Cerebrovascular Accident:  No


Hx Seizures:  No





Review of Systems


All Other Systems:  negative except mentioned in HPI





Physical Exam





Vital Signs








  Date Time  Temp Pulse Resp B/P (MAP) Pulse Ox O2 Delivery O2 Flow Rate FiO2


 


2/2/20 21:49 103.1 140 20 140/100 (113) 96   


 


2/2/20 21:55      Room Air  








Sp02 EP Interpretation:  reviewed, normal


General Appearance:  well appearing, no apparent distress, alert, moderate 

distress


Head:  normocephalic, atraumatic


Eyes:  bilateral eye PERRL, bilateral eye EOMI


ENT:  uvula midline, dry mucus membranes


Neck:  supple, thyroid normal, supple/symm/no masses


Respiratory:  lungs clear, no respiratory distress, no retraction, no accessory 

muscle use


Cardiovascular #1:  normal peripheral pulses, no edema, no gallop, no murmur, 

tachycardia


Gastrointestinal:  non tender, soft, no guarding, no rebound


Musculoskeletal:  normal inspection


Neurologic:  alert, oriented x3


Psychiatric:  mood/affect normal


Skin:  no rash, warm/dry





Procedures


Critical Care Time


Critical Care Time


Given the critical condition in which the patient arrived, the patient was 

immediately assessed by myself and the nurse, and cardiac monitoring initiated 

due to the potential for rapid decompensation of the patient's clinical 

condition. During the course of the patient's stay, I spent a considerable 

amount of time at the bedside performing serial re-evaluations of the patient's 

hemodynamic and clinical status because of the recognized potential threat to 

life or limb in this condition. I then had a chance to review not only all of 

the available current laboratory and radiographic studies obtained today, but I 

also reviewed old records available to me at the time. Additionally, any 

ancillary information available including paramedic records were reviewed. 

Sequential vital signs were obtained. 





Critical Care time of 34 minutes was performed exclusive of billable procedures.





Medical Decision Making


Diagnostic Impression:  


 Primary Impression:  


 Episode of generalized weakness


 Additional Impressions:  


 UTI (lower urinary tract infection)


 Sepsis


 Qualified Codes:  A41.9 - Sepsis, unspecified organism


ER Course


34-year-old female presents with fever/chills


 differential diagnosis includes sepsis, UTI, urosepsis


We will treat for sepsis, bacteremia antibiotics started fluid resuscitation 

started, patient given 3 L of NS based on ideal body weight


We will admit patient to [facility], patient tachycardia resolved


Spoke with Dr. Mendez at 1:41AM


Patient accepted for transfer to [facility]





Laboratory Tests








Test


  2/2/20


22:20 2/2/20


22:55


 


White Blood Count


  12.6 K/UL


(4.8-10.8)  H 


 


 


Red Blood Count


  3.90 M/UL


(4.20-5.40)  L 


 


 


Hemoglobin


  12.5 G/DL


(12.0-16.0) 


 


 


Hematocrit


  38.7 %


(37.0-47.0) 


 


 


Mean Corpuscular Volume 99 FL (80-99)   


 


Mean Corpuscular Hemoglobin


  32.0 PG


(27.0-31.0)  H 


 


 


Mean Corpuscular Hemoglobin


Concent 32.2 G/DL


(32.0-36.0) 


 


 


Red Cell Distribution Width


  11.4 %


(11.6-14.8)  L 


 


 


Platelet Count


  253 K/UL


(150-450) 


 


 


Mean Platelet Volume


  8.0 FL


(6.5-10.1) 


 


 


Neutrophils (%) (Auto)


  74.4 %


(45.0-75.0) 


 


 


Lymphocytes (%) (Auto)


  16.6 %


(20.0-45.0)  L 


 


 


Monocytes (%) (Auto)


  8.2 %


(1.0-10.0) 


 


 


Eosinophils (%) (Auto)


  0.3 %


(0.0-3.0) 


 


 


Basophils (%) (Auto)


  0.6 %


(0.0-2.0) 


 


 


Prothrombin Time


  9.6 SEC


(9.30-11.50) 


 


 


Prothrombin Time INR 0.9 (0.9-1.1)   


 


Activated Partial


Thromboplast Time 29 SEC (23-33)


  


 


 


Sodium Level


  132 MMOL/L


(136-145)  L 


 


 


Potassium Level


  3.6 MMOL/L


(3.5-5.1) 


 


 


Chloride Level


  97 MMOL/L


()  L 


 


 


Carbon Dioxide Level


  26 MMOL/L


(21-32) 


 


 


Anion Gap


  9 mmol/L


(5-15) 


 


 


Blood Urea Nitrogen


  13 mg/dL


(7-18) 


 


 


Creatinine


  1.3 MG/DL


(0.55-1.30) 


 


 


Estimate Glomerular


Filtration Rate 46.9 mL/min


(>60) 


 


 


Glucose Level


  395 MG/DL


()  H 


 


 


Lactic Acid Level


  1.90 mmol/L


(0.4-2.0) 


 


 


Calcium Level


  9.0 MG/DL


(8.5-10.1) 


 


 


Phosphorus Level


  1.9 MG/DL


(2.5-4.9)  L 


 


 


Magnesium Level


  1.8 MG/DL


(1.8-2.4) 


 


 


Total Bilirubin


  0.3 MG/DL


(0.2-1.0) 


 


 


Aspartate Amino Transferase


(AST) 14 U/L (15-37)


L 


 


 


Alanine Aminotransferase (ALT)


  30 U/L (12-78)


  


 


 


Alkaline Phosphatase


  77 U/L


() 


 


 


Total Creatine Kinase


  80 U/L


() 


 


 


Creatine Kinase MB


  < 0.5 NG/ML


(0.0-3.6) 


 


 


Creatine Kinase MB Relative


Index 0.6  


  


 


 


Troponin I


  0.000 ng/mL


(0.000-0.056) 


 


 


Pro-B-Type Natriuretic Peptide


  55 pg/mL


(0-125) 


 


 


Total Protein


  8.2 G/DL


(6.4-8.2) 


 


 


Albumin


  3.4 G/DL


(3.4-5.0) 


 


 


Globulin 4.8 g/dL   


 


Albumin/Globulin Ratio


  0.7 (1.0-2.7)


L 


 


 


Lipase


  137 U/L


() 


 


 


Thyroid Stimulating Hormone


(TSH) 1.972 uiU/mL


(0.358-3.740) 


 


 


Free Thyroxine


  0.92 NG/DL


(0.76-1.46) 


 


 


Free Triiodothyronine


  1.9 pg/mL


(2.3-4.2)  L 


 


 


Urine Color  Pale yellow  


 


Urine Appearance


  


  Slightly


cloudy


 


Urine pH  6 (4.5-8.0)  


 


Urine Specific Gravity


  


  1.010


(1.005-1.035)


 


Urine Protein


  


  1+ (NEGATIVE)


H


 


Urine Glucose (UA)


  


  4+ (NEGATIVE)


H


 


Urine Ketones


  


  2+ (NEGATIVE)


H


 


Urine Blood


  


  2+ (NEGATIVE)


H


 


Urine Nitrite


  


  Negative


(NEGATIVE)


 


Urine Bilirubin


  


  Negative


(NEGATIVE)


 


Urine Urobilinogen


  


  Normal MG/DL


(0.0-1.0)


 


Urine Leukocyte Esterase


  


  3+ (NEGATIVE)


H


 


Urine RBC


  


  2-4 /HPF (0 -


2)  H


 


Urine WBC


  


  Tntc /HPF (0 -


2)  H


 


Urine Squamous Epithelial


Cells 


  Moderate /LPF


(NONE/OCC)  H


 


Urine Bacteria


  


  Moderate /HPF


(NONE)  H


 


Urine HCG, Qualitative


  


  Negative


(NEGATIVE)








Microbiology








 Date/Time


Source Procedure


Growth Status


 


 


 2/2/20 22:20


Nasal Nares - Final Complete


 


 2/2/20 22:20


Nasal Nares - Final Complete








EKG Diagnostic Results


EKG Time:  22:08


EP Interpretation:  Sinus tachycardia, rate 121, QTc 460, normal axis, no acute 

ST elevation





Rhythm Strip Diag. Results


Rhythm Strip Time:  01:18


EP Interpretation:  yes


Rate:  104


Rhythm:  other - Sinus tachycardia





Chest X-Ray Diagnostic Results


Chest X-Ray Diagnostic Results :  


   Chest X-Ray Ordered:  Yes


   # of Views/Limited/Complete:  1 View


   Indication:  Shortness of Breath


   EP Interpretation:  Yes


   Interpretation:  no consolidation, no effusion, no pneumothorax, no acute 

cardiopulmonary disease


   Impression:  No acute disease


   Electronically Signed by:  Laz Campuzano MD





Last Vital Signs








  Date Time  Temp Pulse Resp B/P (MAP) Pulse Ox O2 Delivery O2 Flow Rate FiO2


 


2/2/20 21:55  115 18   Room Air  


 


2/2/20 21:49 103.1   140/100 (113) 96   








Disposition:  [facility]


Condition:  Serious


Referrals:  


NON PHYSICIAN (PCP)





Evaluation


Current Stage of Sepsis:  Sepsis


Possible Source:  Genitourinary





Focused Exam


Allergies:  


Coded Allergies:  


     PENICILLINS (Verified  Allergy, Severe, Anaphylaxis, 9/8/18)


     PINEAPPLE (Verified  Allergy, Severe, 9/8/18)


     SULFAMETHOXAZOLE (Verified  Allergy, Severe, Anaphylaxis, 9/8/18)


     TRIMETHOPRIM (Verified  Allergy, Severe, Anaphylaxis, 9/8/18)


     LATEX, NATURAL RUBBER (Verified  Allergy, Unknown, 9/8/18)


Uncoded Allergies:  


     MUSHROOMS (Allergy, Unknown, 6/28/18)


Date Exam Occurred:  Feb 3, 2020


Time Exam Occurred:  01:17


Laboratory Studies





Laboratory Tests








Test


  2/2/20


22:20 2/2/20


22:55


 


White Blood Count


  12.6 K/UL


(4.8-10.8)  H 


 


 


Red Blood Count


  3.90 M/UL


(4.20-5.40)  L 


 


 


Hemoglobin


  12.5 G/DL


(12.0-16.0) 


 


 


Hematocrit


  38.7 %


(37.0-47.0) 


 


 


Mean Corpuscular Volume 99 FL (80-99)   


 


Mean Corpuscular Hemoglobin


  32.0 PG


(27.0-31.0)  H 


 


 


Mean Corpuscular Hemoglobin


Concent 32.2 G/DL


(32.0-36.0) 


 


 


Red Cell Distribution Width


  11.4 %


(11.6-14.8)  L 


 


 


Platelet Count


  253 K/UL


(150-450) 


 


 


Mean Platelet Volume


  8.0 FL


(6.5-10.1) 


 


 


Neutrophils (%) (Auto)


  74.4 %


(45.0-75.0) 


 


 


Lymphocytes (%) (Auto)


  16.6 %


(20.0-45.0)  L 


 


 


Monocytes (%) (Auto)


  8.2 %


(1.0-10.0) 


 


 


Eosinophils (%) (Auto)


  0.3 %


(0.0-3.0) 


 


 


Basophils (%) (Auto)


  0.6 %


(0.0-2.0) 


 


 


Prothrombin Time


  9.6 SEC


(9.30-11.50) 


 


 


Prothromb Time International


Ratio 0.9 (0.9-1.1)  


  


 


 


Activated Partial


Thromboplast Time 29 SEC (23-33)


  


 


 


Sodium Level


  132 MMOL/L


(136-145)  L 


 


 


Potassium Level


  3.6 MMOL/L


(3.5-5.1) 


 


 


Chloride Level


  97 MMOL/L


()  L 


 


 


Carbon Dioxide Level


  26 MMOL/L


(21-32) 


 


 


Anion Gap


  9 mmol/L


(5-15) 


 


 


Blood Urea Nitrogen


  13 mg/dL


(7-18) 


 


 


Creatinine


  1.3 MG/DL


(0.55-1.30) 


 


 


Estimat Glomerular Filtration


Rate 46.9 mL/min


(>60) 


 


 


Glucose Level


  395 MG/DL


()  H 


 


 


Lactic Acid Level


  1.90 mmol/L


(0.4-2.0) 


 


 


Calcium Level


  9.0 MG/DL


(8.5-10.1) 


 


 


Phosphorus Level


  1.9 MG/DL


(2.5-4.9)  L 


 


 


Magnesium Level


  1.8 MG/DL


(1.8-2.4) 


 


 


Total Bilirubin


  0.3 MG/DL


(0.2-1.0) 


 


 


Aspartate Amino Transf


(AST/SGOT) 14 U/L (15-37)


L 


 


 


Alanine Aminotransferase


(ALT/SGPT) 30 U/L (12-78)


  


 


 


Alkaline Phosphatase


  77 U/L


() 


 


 


Total Creatine Kinase


  80 U/L


() 


 


 


Creatine Kinase MB


  < 0.5 NG/ML


(0.0-3.6) 


 


 


Creatine Kinase MB Relative


Index 0.6  


  


 


 


Troponin I


  0.000 ng/mL


(0.000-0.056) 


 


 


Pro-B-Type Natriuretic Peptide


  55 pg/mL


(0-125) 


 


 


Total Protein


  8.2 G/DL


(6.4-8.2) 


 


 


Albumin


  3.4 G/DL


(3.4-5.0) 


 


 


Globulin 4.8 g/dL   


 


Albumin/Globulin Ratio


  0.7 (1.0-2.7)


L 


 


 


Lipase


  137 U/L


() 


 


 


Thyroid Stimulating Hormone


(TSH) 1.972 uiU/mL


(0.358-3.740) 


 


 


Free Thyroxine


  0.92 NG/DL


(0.76-1.46) 


 


 


Free Triiodothyronine


  1.9 pg/mL


(2.3-4.2)  L 


 


 


Urine Color  Pale yellow  


 


Urine Appearance


  


  Slightly


cloudy


 


Urine pH  6 (4.5-8.0)  


 


Urine Specific Gravity


  


  1.010


(1.005-1.035)


 


Urine Protein


  


  1+ (NEGATIVE)


H


 


Urine Glucose (UA)


  


  4+ (NEGATIVE)


H


 


Urine Ketones


  


  2+ (NEGATIVE)


H


 


Urine Blood


  


  2+ (NEGATIVE)


H


 


Urine Nitrite


  


  Negative


(NEGATIVE)


 


Urine Bilirubin


  


  Negative


(NEGATIVE)


 


Urine Urobilinogen


  


  Normal MG/DL


(0.0-1.0)


 


Urine Leukocyte Esterase


  


  3+ (NEGATIVE)


H


 


Urine RBC


  


  2-4 /HPF (0 -


2)  H


 


Urine WBC


  


  Tntc /HPF (0 -


2)  H


 


Urine Squamous Epithelial


Cells 


  Moderate /LPF


(NONE/OCC)  H


 


Urine Bacteria


  


  Moderate /HPF


(NONE)  H


 


Urine HCG, Qualitative


  


  Negative


(NEGATIVE)








Vital Signs





Last 24 Hour Vital Signs








  Date Time  Temp Pulse Resp B/P (MAP) Pulse Ox O2 Delivery O2 Flow Rate FiO2


 


2/2/20 21:55  115 18   Room Air  


 


2/2/20 21:49 103.1 140 20 140/100 (113) 96   








Respiratory Exam:  Clear


Cardiovascular Exam:  RRR, S1, S2


Capillary Refill:  Less Than 2 Seconds


Peripheral Pulse:  Strong


Pulse Location:  Radial


Skin Exam:  Normal Jose Eliasgor











Laz Campuzano MD Feb 3, 2020 01:19

## 2020-02-03 NOTE — NUR
ED Nurse Note:



Attempt #2 to call [facility] for pt transfer; pending report handoff 
for transfer.

## 2020-02-03 NOTE — NUR
ED Nurse Note:

Pt transferred to [facility] with ambulance personnel. Pt is alert and 
orientedx4. Pt took all belongings. VSS.